# Patient Record
Sex: FEMALE | Race: WHITE | Employment: FULL TIME | URBAN - METROPOLITAN AREA
[De-identification: names, ages, dates, MRNs, and addresses within clinical notes are randomized per-mention and may not be internally consistent; named-entity substitution may affect disease eponyms.]

---

## 2017-06-23 ENCOUNTER — HOSPITAL ENCOUNTER (OUTPATIENT)
Dept: MAMMOGRAPHY | Age: 47
Discharge: HOME | End: 2017-06-23
Attending: OBSTETRICS & GYNECOLOGY
Payer: COMMERCIAL

## 2017-06-23 DIAGNOSIS — Z12.31 VISIT FOR SCREENING MAMMOGRAM: ICD-10-CM

## 2017-06-23 PROCEDURE — 77063 BREAST TOMOSYNTHESIS BI: CPT

## 2017-08-03 ENCOUNTER — DOCUMENTATION ONLY (OUTPATIENT)
Dept: SURGERY | Age: 47
End: 2017-08-03

## 2021-11-18 ENCOUNTER — HOSPITAL ENCOUNTER (OUTPATIENT)
Dept: CT IMAGING | Age: 51
Discharge: HOME | End: 2021-11-18
Payer: COMMERCIAL

## 2021-11-18 DIAGNOSIS — Z13.6 SCREENING FOR CARDIOVASCULAR CONDITION: ICD-10-CM

## 2021-11-18 PROCEDURE — 75571 CT HRT W/O DYE W/CA TEST: CPT

## 2021-11-30 ENCOUNTER — TELEPHONE (OUTPATIENT)
Dept: OTHER | Age: 51
End: 2021-11-30

## 2025-06-02 ENCOUNTER — OFFICE VISIT (OUTPATIENT)
Age: 55
End: 2025-06-02
Payer: COMMERCIAL

## 2025-06-02 VITALS
WEIGHT: 169 LBS | HEART RATE: 87 BPM | SYSTOLIC BLOOD PRESSURE: 118 MMHG | OXYGEN SATURATION: 96 % | BODY MASS INDEX: 26.53 KG/M2 | DIASTOLIC BLOOD PRESSURE: 78 MMHG | HEIGHT: 67 IN | RESPIRATION RATE: 18 BRPM

## 2025-06-02 DIAGNOSIS — I83.893 VARICOSE VEINS OF LOWER EXTREMITY WITH EDEMA, BILATERAL: Primary | ICD-10-CM

## 2025-06-02 PROCEDURE — 99203 OFFICE O/P NEW LOW 30 MIN: CPT | Performed by: NURSE PRACTITIONER

## 2025-06-02 RX ORDER — ERGOCALCIFEROL 1.25 MG/1
1 CAPSULE, LIQUID FILLED ORAL WEEKLY
COMMUNITY

## 2025-06-02 RX ORDER — LANSOPRAZOLE 30 MG/1
30 CAPSULE, DELAYED RELEASE ORAL DAILY
COMMUNITY

## 2025-06-02 NOTE — PROGRESS NOTES
Noemy Green    Chief Complaint   Patient presents with    Varicose Veins       History and Physical    Noemy Green is a 54 y.o. female with PMH significant for venous insufficiency, bilateral lower limb edema and hyperlipidemia.  Very active - cycling, yoga and going to the gym.  Recently she noticed she was having a difficult time getting started moving at the gym.  She has always had issues with venous insufficiency and has had previous venous interventions but, she does not recall what, from Dr. Mas.    Today she describes continued feeling of leg heaviness and difficult to start moving.  She also does not like the appearance of the bulging varicose veins throughout her legs and states they can also itch and burn by the end of the day..       Associated symptoms:   [x] edema  [x] varicose veins  [x] heaviness/aching  [x] fatigue  [] Pain  [] H/o or current ulcer(s)    Additional Symptoms despite conservative therapy (may indicate lymph pathology):   [] Hyperkeratosis  [x] Hyperplasia  [] Hyperpigmentation  [] Skin breakdown with lymphorrhea (skin weeping)  [] Papillomatosis  [] Recurrent cellulitis  [] Fibrosis  [] Elephantiasis  [] Progressive edema  [] Truncal/abdominal swelling    Aggravating factors include standing. Relieving factors include resting, compression.     Patient   [x] has   [] has not   been wearing compression stockings. Stockings are knee high, 20-30mm Hg compression strength and received some relief from symptoms.      Relevant history:   [x] female gender  [x] Family history of venous disease: Mother and Father's size.  [x] history of pregnancy: twins  [] history of DVT/PE  [x] history of vein procedure  [x] Personal or family history of coronary artery disease (mother's side)      Comorbid conditions contributory to lower extremity edema: n/a  [] CHF/pulmonary HTN  [] ABHI/snoring  [] CKD  [] Pulmonary disease  [] Obesity   [] H/o DVT  [] Activity level    Trunk/Abdominal

## 2025-09-03 ENCOUNTER — OFFICE VISIT (OUTPATIENT)
Age: 55
End: 2025-09-03
Payer: COMMERCIAL

## 2025-09-03 VITALS
HEIGHT: 67 IN | OXYGEN SATURATION: 98 % | SYSTOLIC BLOOD PRESSURE: 138 MMHG | DIASTOLIC BLOOD PRESSURE: 62 MMHG | HEART RATE: 69 BPM | BODY MASS INDEX: 27.62 KG/M2 | WEIGHT: 176 LBS

## 2025-09-03 DIAGNOSIS — I83.893 VARICOSE VEINS OF LOWER EXTREMITY WITH EDEMA, BILATERAL: Primary | ICD-10-CM

## 2025-09-03 PROCEDURE — 99214 OFFICE O/P EST MOD 30 MIN: CPT | Performed by: SURGERY
